# Patient Record
Sex: FEMALE | Race: WHITE
[De-identification: names, ages, dates, MRNs, and addresses within clinical notes are randomized per-mention and may not be internally consistent; named-entity substitution may affect disease eponyms.]

---

## 2020-08-28 ENCOUNTER — HOSPITAL ENCOUNTER (INPATIENT)
Dept: HOSPITAL 61 - 3 SO LND | Age: 20
LOS: 2 days | Discharge: HOME | End: 2020-08-30
Attending: OBSTETRICS & GYNECOLOGY | Admitting: OBSTETRICS & GYNECOLOGY
Payer: COMMERCIAL

## 2020-08-28 VITALS — BODY MASS INDEX: 29.09 KG/M2 | WEIGHT: 181 LBS | HEIGHT: 66 IN

## 2020-08-28 VITALS — SYSTOLIC BLOOD PRESSURE: 133 MMHG | DIASTOLIC BLOOD PRESSURE: 78 MMHG

## 2020-08-28 DIAGNOSIS — Z88.1: ICD-10-CM

## 2020-08-28 DIAGNOSIS — Z20.828: ICD-10-CM

## 2020-08-28 DIAGNOSIS — Z3A.38: ICD-10-CM

## 2020-08-28 DIAGNOSIS — D64.9: ICD-10-CM

## 2020-08-28 LAB
APTT PPP: YELLOW S
BACTERIA #/AREA URNS HPF: (no result) /HPF
BASOPHILS # BLD AUTO: 0 X10^3/UL (ref 0–0.2)
BASOPHILS NFR BLD: 0 % (ref 0–3)
BILIRUB UR QL STRIP: NEGATIVE
EOSINOPHIL NFR BLD: 0 % (ref 0–3)
EOSINOPHIL NFR BLD: 0 X10^3/UL (ref 0–0.7)
ERYTHROCYTE [DISTWIDTH] IN BLOOD BY AUTOMATED COUNT: 13 % (ref 11.5–14.5)
FIBRINOGEN PPP-MCNC: (no result) MG/DL
HCT VFR BLD CALC: 31.9 % (ref 36–47)
HGB BLD-MCNC: 11.1 G/DL (ref 12–15.5)
LYMPHOCYTES # BLD: 2.4 X10^3/UL (ref 1–4.8)
LYMPHOCYTES NFR BLD AUTO: 14 % (ref 24–48)
MCH RBC QN AUTO: 32 PG (ref 25–35)
MCHC RBC AUTO-ENTMCNC: 35 G/DL (ref 31–37)
MCV RBC AUTO: 92 FL (ref 79–100)
MONO #: 0.9 X10^3/UL (ref 0–1.1)
MONOCYTES NFR BLD: 5 % (ref 0–9)
NEUT #: 14.1 X10^3/UL (ref 1.8–7.7)
NEUTROPHILS NFR BLD AUTO: 80 % (ref 31–73)
NITRITE UR QL STRIP: NEGATIVE
PH UR STRIP: 7.5 [PH]
PLATELET # BLD AUTO: 281 X10^3/UL (ref 140–400)
PROT UR STRIP-MCNC: 100 MG/DL
RBC # BLD AUTO: 3.47 X10^6/UL (ref 3.5–5.4)
RBC #/AREA URNS HPF: (no result) /HPF (ref 0–2)
SQUAMOUS #/AREA URNS LPF: (no result) /LPF
UROBILINOGEN UR-MCNC: 0.2 MG/DL
WBC # BLD AUTO: 17.6 X10^3/UL (ref 4–11)
WBC #/AREA URNS HPF: (no result) /HPF (ref 0–4)

## 2020-08-28 PROCEDURE — 85014 HEMATOCRIT: CPT

## 2020-08-28 PROCEDURE — 86901 BLOOD TYPING SEROLOGIC RH(D): CPT

## 2020-08-28 PROCEDURE — 36415 COLL VENOUS BLD VENIPUNCTURE: CPT

## 2020-08-28 PROCEDURE — 86787 VARICELLA-ZOSTER ANTIBODY: CPT

## 2020-08-28 PROCEDURE — 86850 RBC ANTIBODY SCREEN: CPT

## 2020-08-28 PROCEDURE — 86900 BLOOD TYPING SEROLOGIC ABO: CPT

## 2020-08-28 PROCEDURE — 85007 BL SMEAR W/DIFF WBC COUNT: CPT

## 2020-08-28 PROCEDURE — 86803 HEPATITIS C AB TEST: CPT

## 2020-08-28 PROCEDURE — 87426 SARSCOV CORONAVIRUS AG IA: CPT

## 2020-08-28 PROCEDURE — 81001 URINALYSIS AUTO W/SCOPE: CPT

## 2020-08-28 PROCEDURE — 85025 COMPLETE CBC W/AUTO DIFF WBC: CPT

## 2020-08-28 PROCEDURE — 87086 URINE CULTURE/COLONY COUNT: CPT

## 2020-08-28 PROCEDURE — 85018 HEMOGLOBIN: CPT

## 2020-08-28 PROCEDURE — G0378 HOSPITAL OBSERVATION PER HR: HCPCS

## 2020-08-28 PROCEDURE — 87340 HEPATITIS B SURFACE AG IA: CPT

## 2020-08-28 PROCEDURE — 86592 SYPHILIS TEST NON-TREP QUAL: CPT

## 2020-08-28 PROCEDURE — 86762 RUBELLA ANTIBODY: CPT

## 2020-08-28 RX ADMIN — SODIUM CHLORIDE, SODIUM LACTATE, POTASSIUM CHLORIDE, AND CALCIUM CHLORIDE SCH MLS/HR: .6; .31; .03; .02 INJECTION, SOLUTION INTRAVENOUS at 23:19

## 2020-08-29 VITALS — DIASTOLIC BLOOD PRESSURE: 64 MMHG | SYSTOLIC BLOOD PRESSURE: 106 MMHG

## 2020-08-29 VITALS — SYSTOLIC BLOOD PRESSURE: 117 MMHG | DIASTOLIC BLOOD PRESSURE: 71 MMHG

## 2020-08-29 VITALS — DIASTOLIC BLOOD PRESSURE: 87 MMHG | SYSTOLIC BLOOD PRESSURE: 126 MMHG

## 2020-08-29 VITALS — DIASTOLIC BLOOD PRESSURE: 70 MMHG | SYSTOLIC BLOOD PRESSURE: 109 MMHG

## 2020-08-29 LAB
% BANDS: 3 % (ref 0–9)
% LYMPHS: 23 % (ref 24–48)
% METAS: 1 % (ref 0–0)
% MONOS: 2 % (ref 0–10)
% SEGS: 71 % (ref 35–66)
PLATELET # BLD EST: ADEQUATE 10*3/UL

## 2020-08-29 RX ADMIN — Medication SCH TAB: at 08:52

## 2020-08-29 RX ADMIN — SODIUM CHLORIDE, SODIUM LACTATE, POTASSIUM CHLORIDE, AND CALCIUM CHLORIDE SCH MLS/HR: .6; .31; .03; .02 INJECTION, SOLUTION INTRAVENOUS at 00:37

## 2020-08-29 NOTE — PDOC1
OB/GYN H&P


Date of Admission:


Date of Admission:  Aug 28, 2020 at 22:18





History of Present Illness:


EDC: 20


LMP: 19





20y  @ 38.0 by 19wk u/s presents with LOF.  The pt was confirmed SROM.  The 

pt was found to be 3 cm dilated and ctxing regularly.





PMH: Denies


PSH: Denies


OBHx: G1


SH: no tob, no EtOH


FH: noncontributory





Medications:


Meds:





Current Medications








 Medications


  (Trade)  Dose


 Ordered  Sig/Silviano


 Route


 PRN Reason  Start Time


 Stop Time Status Last Admin


Dose Admin


 


 Ringer's Solution  1,000 ml @ 


 125 mls/hr  Q8H


 IV


   20 22:25


    20 00:37





 


 Fentanyl Citrate  50 ml @ 14


 mls/hr  CONT  PRN


 EPID


 PAIN  20 00:15


    20 00:35














Allergies:


Coded Allergies:  


     Sulfa (Sulfonamide Antibiotics) (Verified  Allergy, Intermediate, 20)





Physical Exam:


Vital Signs:





                                   Vital Signs








  Date Time  Temp Pulse Resp B/P (MAP) Pulse Ox O2 Delivery O2 Flow Rate FiO2


 


20 00:35   18   Room Air  


 


20 23:36 98.9 129  133/78 (96)    





 98.9       








PE:


GENERAL:       No apparent distress. Alert and oriented.


HEENT:            Head normocephalic, atraumatic.


NECK:              Supple


LUNGS:            Clear to auscultation.


HEART:            RRR, S1, S2 present, pulses intact


ABDOMEN:       Soft, positive bowel sounds.


EXTREMITIES:  No cyanosis or edema.


NEUROLOGIC:  Normal speech, normal tone


PSYCHIATRIC:  Normal affect, normal mood.


SKIN:                No ulceration.








FHT: 120s +acels/no decels/mLTV


[place]: 1-2 min


SVE: C/C/+2


Labs:





                                Laboratory Tests








Test


 20


22:45 20


23:10


 


Urine Collection Type Unknown   


 


Urine Color Yellow   


 


Urine Clarity Turbid   


 


Urine pH


 7.5 (<5.0-8.0)


 





 


Urine Specific Gravity


 <=1.005


(1.000-1.030) 





 


Urine Protein


 100 mg/dL


(NEG-TRACE) 





 


Urine Glucose (UA)


 Negative mg/dL


(NEG) 





 


Urine Ketones (Stick)


 Negative mg/dL


(NEG) 





 


Urine Blood Small (NEG)   


 


Urine Nitrite


 Negative (NEG)


 





 


Urine Bilirubin


 Negative (NEG)


 





 


Urine Urobilinogen Dipstick


 0.2 mg/dL (0.2


mg/dL) 





 


Urine Leukocyte Esterase Small (NEG)   


 


Urine RBC


 1-2 /HPF (0-2)


 





 


Urine WBC


 1-4 /HPF (0-4)


 





 


Urine Squamous Epithelial


Cells Mod /LPF  


 





 


Urine Bacteria


 Few /HPF


(0-FEW) 





 


SARS-CoV-2 Antigen (Rapid)


 Negative


(NEGATIVE) 





 


White Blood Count


 


 17.6 x10^3/uL


(4.0-11.0)  H


 


Red Blood Count


 


 3.47 x10^6/uL


(3.50-5.40)  L


 


Hemoglobin


 


 11.1 g/dL


(12.0-15.5)  L


 


Hematocrit


 


 31.9 %


(36.0-47.0)  L


 


Mean Corpuscular Volume


 


 92 fL ()





 


Mean Corpuscular Hemoglobin  32 pg (25-35)  


 


Mean Corpuscular Hemoglobin


Concent 


 35 g/dL


(31-37)


 


Red Cell Distribution Width


 


 13.0 %


(11.5-14.5)


 


Platelet Count


 


 281 x10^3/uL


(140-400)


 


Neutrophils (%) (Auto)  80 % (31-73)  H


 


Lymphocytes (%) (Auto)  14 % (24-48)  L


 


Monocytes (%) (Auto)  5 % (0-9)  


 


Eosinophils (%) (Auto)  0 % (0-3)  


 


Basophils (%) (Auto)  0 % (0-3)  


 


Neutrophils # (Auto)


 


 14.1 x10^3/uL


(1.8-7.7)  H


 


Lymphocytes # (Auto)


 


 2.4 x10^3/uL


(1.0-4.8)


 


Monocytes # (Auto)


 


 0.9 x10^3/uL


(0.0-1.1)


 


Eosinophils # (Auto)


 


 0.0 x10^3/uL


(0.0-0.7)


 


Basophils # (Auto)


 


 0.0 x10^3/uL


(0.0-0.2)


 


Platelet Estimate  Pending  


 


Treponema pallidum Antibody


 


 Nonreactive


(Nonreactive)


 


Hepatitis B Surface Antigen


 


 Nonreactive


(Nonreactive)


 


Hepatitis C IgG Antibody  Pending  





                                Laboratory Tests


20 23:10








                                Laboratory Tests


20 23:10











Assessment & Plan:


A/P 20y  @ 38.0 by 19wk u/s


1.) Late presentation to care


2.) Anemia - on Fe BID


3.) Bactrim allergy


4.) Tob use - discussed cessation


5.) Fetus  cat I, vtx


6.) Boy - Rl


7.) TDAP given 20


8.) Desire Gardasil PP


9.) GBS neg











RAHUL DEE MD             Aug 29, 2020 03:12

## 2020-08-29 NOTE — PDOC
VAGINAL DELIVERY


DATE


DATE: 8/29/20 


TIME: 03:17


TIME


Patient delivered a viable male infant over intact perineum at 0257.  


Wt 7lb 10oz. Apgars 9/9.  Placenta delivered spontaneously, 


intact with 3VC.  No lacerations noted.  Good hemostasis noted.  


20 U of Pit given with IVF.  EBL 200cc.


WEIGHT


Weight [ ]











RAHUL DEE MD             Aug 29, 2020 03:18

## 2020-08-30 VITALS — SYSTOLIC BLOOD PRESSURE: 134 MMHG | DIASTOLIC BLOOD PRESSURE: 87 MMHG

## 2020-08-30 VITALS — SYSTOLIC BLOOD PRESSURE: 115 MMHG | DIASTOLIC BLOOD PRESSURE: 78 MMHG

## 2020-08-30 VITALS — SYSTOLIC BLOOD PRESSURE: 100 MMHG | DIASTOLIC BLOOD PRESSURE: 45 MMHG

## 2020-08-30 LAB
HCT VFR BLD CALC: 31.2 % (ref 36–47)
HGB BLD-MCNC: 10.9 G/DL (ref 12–15.5)
MCHC RBC AUTO-ENTMCNC: 35 G/DL (ref 31–37)

## 2020-08-30 RX ADMIN — Medication SCH TAB: at 08:38

## 2020-08-30 NOTE — NUR
Discharge and follow up instructions reviewed and given to pt along with Rx for ibuprofen 
and colace.  Pt and S/O verbalized instructions and denied any complaints at time of 
discharge.  Pt ambulated out of hospital with staff, S/O and infant.

## 2020-08-30 NOTE — DS
DATE OF DISCHARGE:  2020



ADMISSION DIAGNOSES:

1.  Intrauterine pregnancy at 38 weeks and 0 days by 19-week ultrasound.

2.  Spontaneous rupture of membranes.

3.  Late presentation to care.

4.  Anemia.

5.  BACTRIM ALLERGY.

6.  Tobacco use.

7.  GBS negative.



DISCHARGE DIAGNOSES:

1.  Intrauterine pregnancy at 38 weeks and 0 days by 19-week ultrasound.

2.  Spontaneous rupture of membranes.

3.  Late presentation to care.

4.  Anemia.

5.  BACTRIM ALLERGY.

6.  Tobacco use.

7.  GBS negative.



PROCEDURE:  Spontaneous vaginal delivery.



BRIEF HOSPITAL COURSE:  The patient is a 20-year-old  1, para 0, who

presented to Labor and Delivery at 38 weeks and 0 days by 19-week ultrasound

with leakage of fluid.  The patient was confirmed ruptured.  At that time, the

patient was 3 cm dilated and cecilia regularly.  The patient ultimately

progressed to complete, delivered by vaginal delivery; see delivery note for

full details.  By postpartum day #1, the patient was meeting all discharge

criteria and desired discharge home.  Of note, the patient's hemoglobin on

admission was 11.1 and on postpartum day #1, was found to be 10.9.



DISCHARGE INSTRUCTIONS:  The patient was told not to lift anything greater than

20 pounds, have pelvic rest for 6 weeks.



CALL IF:  The patient was to call if she has fevers, chills, nausea, vomiting,

abdominal pain or any additional questions or concerns.



FOLLOWUP APPOINTMENT:  The patient is to follow up in 6 weeks' time for a

postpartum appointment.



DISCHARGE MEDICATIONS:  The patient was given a prescription for Motrin 800 mg

30 pills and Colace 100 mg 30 pills.

 



______________________________

RAHUL DEE MD



DR:  IVON/nelly  JOB#:  [id number] / [id number]

DD:  2020 20:24  DT:  2020 20:51

## 2020-08-30 NOTE — PDOC
OB/GYN PROGRESS NOTE


Date of Service:


DATE: 20 


TIME: 07:46





Subjective:


Pt with good pain control.  Glen PO.  Voiding.  Minimal lochia.





Objective:


Vital Signs:





Vital Signs








  Date Time  Temp Pulse Resp B/P (MAP) Pulse Ox O2 Delivery O2 Flow Rate FiO2


 


20 08:00 99.2 99 17 117/71 (86) 96 Room Air  





 99.2       








Vital Signs








  Date Time  Temp Pulse Resp B/P (MAP) Pulse Ox O2 Delivery O2 Flow Rate FiO2


 


20 05:28 97.9 80 16 100/45 (63) 98 Room Air  





 97.9       











Physical Exam:


GENERAL:       No apparent distress. Alert and oriented.


HEENT:            Head normocephalic, atraumatic.


NECK:              Supple


LUNGS:            Clear to auscultation.


HEART:            RRR, S1, S2 present, pulses intact


ABDOMEN:       Soft, positive bowel sounds.


EXTREMITIES:  No cyanosis or edema.


NEUROLOGIC:  Normal speech, normal tone


PSYCHIATRIC:  Normal affect, normal mood.


SKIN:                No ulceration.





FFNT below umb


no C/C/E





Assessment & Plan:


A/P 20y  PPD #1 s/p 


1.) PP - dong well


2.) Anemia - 11.1 -> pending


3.) Bactrim allergy


4.) Tob use - discussed cessation


5.) Boy - Rl


6.) TDAP given 20











RAHUL DEE MD             Aug 30, 2020 07:51